# Patient Record
Sex: FEMALE | Race: WHITE
[De-identification: names, ages, dates, MRNs, and addresses within clinical notes are randomized per-mention and may not be internally consistent; named-entity substitution may affect disease eponyms.]

---

## 2017-10-29 NOTE — EDPHY
H & P


Stated Complaint: vaginal spotting x 2 hours --used a quarter pad so far, mild 

cramping





- Personal History


LMP (Females 10-55): Over 28 Days Ago


Current Tetanus/Diphtheria Vaccine: Unsure


Current Tetanus Diphtheria and Acellular Pertussis (TDAP): Unsure


Tetanus Vaccine Date: 10/2015





- Medical/Surgical History


Hx Asthma: No


Hx Chronic Respiratory Disease: No


Hx Diabetes: No


Hx Cardiac Disease: No


Hx Renal Disease: No


Hx Cirrhosis: No


Hx Alcoholism: No


Hx HIV/AIDS: No


Hx Splenectomy or Spleen Trauma: No


Other PMH: HX: OVARIAN CYST, ENDOMETRIOSIS





- Social History


Smoking Status: Never smoked


Time Seen by Provider: 10/29/17 13:08


HPI/ROS: 





Chief Complaint:  Pregnant, vaginal bleeding





HPI:  27-year-old woman who is status post IVF on October 1st.  She had an 

ultrasound 3 days ago which revealed 2 intrauterine pregnancies, 1 with a 

viable heartbeat level was told the pregnancy is still early because he was at 

6 weeks 2 days.  About 3 hours ago she felt a rush of blood vaginally.  Has 

soaked about a quarter size blood on a pad.  Had some very mild cramping.  Has 

not had any further bleeding.  No nausea or vomiting.  No fevers or chills.





ROS:  10 point Review of Systems is negative except as noted in the HPI.





PMH:  Infertility, IVF





Social History: No smoking, no alcohol,  no recreational drug use





Family History: non-contributory





Physical Exam:


Gen: Awake, Alert, No Distress


HEENT:  


     Nose: no rhinorrhea


     Eyes: PERRLA, EOMI


     Mouth: Moist mucosa 


Neck: Supple, no JVD


Chest: nontender, lungs clear to auscultation


Heart: S1, S2 normal, no murmur


Abd: Soft, non-tender, no guarding


Back: no CVA tenderness, no midline tenderness 


Ext: no edema, non-tender


Skin: no rash


Neuro: CN II-XII intact, Sensation grossly intact, Strength 5/5 in bilateral 

upper and lower extremities


 (Jaswinder Lester)


Constitutional: 


 Initial Vital Signs











Temperature (C)  37.0 C   10/29/17 12:25


 


Heart Rate  77   10/29/17 12:25


 


Respiratory Rate  16   10/29/17 12:25


 


Blood Pressure  111/67   10/29/17 12:25


 


O2 Sat (%)  98   10/29/17 12:25








 











O2 Delivery Mode               Room Air














Allergies/Adverse Reactions: 


 





No Known Allergies Allergy (Unverified 10/29/17 12:23)


 








Home Medications: 














 Medication  Instructions  Recorded


 


Estrogens, Conjugated  10/29/17


 


Progesterone  10/29/17














Medical Decision Making


ED Course/Re-evaluation: 





1610: Evaluated patient and discussed ultrasound results, which show a 

subchorionic hemorrhage. I answered all her questions and recommended resting 

over the next few days. She plans to follow up with her OB this week. She's 

been given the full radiologist's report and a disc of her images to bring with 

her. We discussed return precautions. She is comfortable with plan for follow 

up.  (Gely Rangel)





- Data Points


Laboratory Results: 


 











  10/29/17





  13:10


 


Beta HCG, Quant  66134.00 mIU/mL H mIU/mL





   (0.00-4.83) 














Departure





- Departure


Disposition: Home, Routine, Self-Care


Clinical Impression: 


Subchorionic hemorrhage in first trimester


Qualifiers:


 Fetus number: single or unspecified fetus Qualified Code(s): O41.8X10 - Other 

specified disorders of amniotic fluid and membranes, first trimester, not 

applicable or unspecified





Condition: Good


Instructions:  Subchorionic Hemorrhage (ED)


Additional Instructions: 


Follow up with your doctors in [place] tomorrow. Bring the radiologist's report 

and CD images with you to that appointment. Return to the ED for severe pain, 

dramatic increase in bleeding, lightheadedness, or other worsening of condition.


Referrals: 


Adelaida Augustin MD [Primary Care Provider] - As per Instructions


Stand Alone Forms:  Work Excuse

## 2017-10-29 NOTE — EDPHY
H & P


Stated Complaint: vaginal spotting x 2 hours --used a quarter pad so far, mild 

cramping





- Personal History


LMP (Females 10-55): Over 28 Days Ago


Current Tetanus/Diphtheria Vaccine: Unsure


Current Tetanus Diphtheria and Acellular Pertussis (TDAP): Unsure


Tetanus Vaccine Date: 10/2015





- Medical/Surgical History


Hx Asthma: No


Hx Chronic Respiratory Disease: No


Hx Diabetes: No


Hx Cardiac Disease: No


Hx Renal Disease: No


Hx Cirrhosis: No


Hx Alcoholism: No


Hx HIV/AIDS: No


Hx Splenectomy or Spleen Trauma: No


Other PMH: HX: OVARIAN CYST, ENDOMETRIOSIS





- Social History


Smoking Status: Never smoked


Time Seen by Provider: 10/29/17 13:08


HPI/ROS: 





Chief Complaint:  Pregnant, vaginal bleeding





HPI:  27-year-old woman who is status post IVF on October 1st.  She had an 

ultrasound 3 days ago which revealed 2 intrauterine pregnancies, 1 with a 

viable heartbeat level was told the pregnancy is still early because he was at 

6 weeks 2 days.  About 3 hours ago she felt a rush of blood vaginally.  Has 

soaked about a quarter size blood on a pad.  Had some very mild cramping.  Has 

not had any further bleeding.  No nausea or vomiting.  No fevers or chills.





ROS:  10 point Review of Systems is negative except as noted in the HPI.





PMH:  Infertility, IVF





Social History: No smoking, no alcohol,  no recreational drug use





Family History: non-contributory





Physical Exam:


Gen: Awake, Alert, No Distress


HEENT:  


     Nose: no rhinorrhea


     Eyes: PERRLA, EOMI


     Mouth: Moist mucosa 


Neck: Supple, no JVD


Chest: nontender, lungs clear to auscultation


Heart: S1, S2 normal, no murmur


Abd: Soft, non-tender, no guarding


Back: no CVA tenderness, no midline tenderness 


Ext: no edema, non-tender


Skin: no rash


Neuro: CN II-XII intact, Sensation grossly intact, Strength 5/5 in bilateral 

upper and lower extremities


 (Jaswinder Lester)


Constitutional: 


 Initial Vital Signs











Temperature (C)  37.0 C   10/29/17 12:25


 


Heart Rate  77   10/29/17 12:25


 


Respiratory Rate  16   10/29/17 12:25


 


Blood Pressure  111/67   10/29/17 12:25


 


O2 Sat (%)  98   10/29/17 12:25








 











O2 Delivery Mode               Room Air














Allergies/Adverse Reactions: 


 





No Known Allergies Allergy (Unverified 10/29/17 12:23)


 








Home Medications: 














 Medication  Instructions  Recorded


 


Estrogens, Conjugated  10/29/17


 


Progesterone  10/29/17














Medical Decision Making


ED Course/Re-evaluation: 





1610: Evaluated patient and discussed ultrasound results, which show a 

subchorionic hemorrhage. I answered all her questions and recommended resting 

over the next few days. She plans to follow up with her OB this week. She's 

been given the full radiologist's report and a disc of her images to bring with 

her. We discussed return precautions. She is comfortable with plan for follow 

up.  (Gely Rangel)





- Data Points


Laboratory Results: 


 











  10/29/17





  13:10


 


Beta HCG, Quant  88627.00 mIU/mL H mIU/mL





   (0.00-4.83) 














Departure





- Departure


Disposition: Home, Routine, Self-Care


Clinical Impression: 


Subchorionic hemorrhage in first trimester


Qualifiers:


 Fetus number: single or unspecified fetus Qualified Code(s): O41.8X10 - Other 

specified disorders of amniotic fluid and membranes, first trimester, not 

applicable or unspecified





Condition: Good


Instructions:  Subchorionic Hemorrhage (ED)


Additional Instructions: 


Follow up with your doctors in [place] tomorrow. Bring the radiologist's report 

and CD images with you to that appointment. Return to the ED for severe pain, 

dramatic increase in bleeding, lightheadedness, or other worsening of condition.


Referrals: 


Adelaida Augustin MD [Primary Care Provider] - As per Instructions


Stand Alone Forms:  Work Excuse

## 2017-10-29 NOTE — EDPHY
H & P


Stated Complaint: vaginal spotting x 2 hours --used a quarter pad so far, mild 

cramping





- Personal History


LMP (Females 10-55): Over 28 Days Ago


Current Tetanus/Diphtheria Vaccine: Unsure


Current Tetanus Diphtheria and Acellular Pertussis (TDAP): Unsure


Tetanus Vaccine Date: 10/2015





- Medical/Surgical History


Hx Asthma: No


Hx Chronic Respiratory Disease: No


Hx Diabetes: No


Hx Cardiac Disease: No


Hx Renal Disease: No


Hx Cirrhosis: No


Hx Alcoholism: No


Hx HIV/AIDS: No


Hx Splenectomy or Spleen Trauma: No


Other PMH: HX: OVARIAN CYST, ENDOMETRIOSIS





- Social History


Smoking Status: Never smoked


Time Seen by Provider: 10/29/17 13:08


HPI/ROS: 





Chief Complaint:  Pregnant, vaginal bleeding





HPI:  27-year-old woman who is status post IVF on October 1st.  She had an 

ultrasound 3 days ago which revealed 2 intrauterine pregnancies, 1 with a 

viable heartbeat level was told the pregnancy is still early because he was at 

6 weeks 2 days.  About 3 hours ago she felt a rush of blood vaginally.  Has 

soaked about a quarter size blood on a pad.  Had some very mild cramping.  Has 

not had any further bleeding.  No nausea or vomiting.  No fevers or chills.





ROS:  10 point Review of Systems is negative except as noted in the HPI.





PMH:  Infertility, IVF





Social History: No smoking, no alcohol,  no recreational drug use





Family History: non-contributory





Physical Exam:


Gen: Awake, Alert, No Distress


HEENT:  


     Nose: no rhinorrhea


     Eyes: PERRLA, EOMI


     Mouth: Moist mucosa 


Neck: Supple, no JVD


Chest: nontender, lungs clear to auscultation


Heart: S1, S2 normal, no murmur


Abd: Soft, non-tender, no guarding


Back: no CVA tenderness, no midline tenderness 


Ext: no edema, non-tender


Skin: no rash


Neuro: CN II-XII intact, Sensation grossly intact, Strength 5/5 in bilateral 

upper and lower extremities


 (Jaswinder Lester)


Constitutional: 


 Initial Vital Signs











Temperature (C)  37.0 C   10/29/17 12:25


 


Heart Rate  77   10/29/17 12:25


 


Respiratory Rate  16   10/29/17 12:25


 


Blood Pressure  111/67   10/29/17 12:25


 


O2 Sat (%)  98   10/29/17 12:25








 











O2 Delivery Mode               Room Air














Allergies/Adverse Reactions: 


 





No Known Allergies Allergy (Unverified 10/29/17 12:23)


 








Home Medications: 














 Medication  Instructions  Recorded


 


Estrogens, Conjugated  10/29/17


 


Progesterone  10/29/17














Medical Decision Making


ED Course/Re-evaluation: 





1610: Evaluated patient and discussed ultrasound results, which show a 

subchorionic hemorrhage. I answered all her questions and recommended resting 

over the next few days. She plans to follow up with her OB this week. She's 

been given the full radiologist's report and a disc of her images to bring with 

her. We discussed return precautions. She is comfortable with plan for follow 

up.  (Gely Rangel)





- Data Points


Laboratory Results: 


 











  10/29/17





  13:10


 


Beta HCG, Quant  08472.00 mIU/mL H mIU/mL





   (0.00-4.83) 














Departure





- Departure


Disposition: Home, Routine, Self-Care


Clinical Impression: 


Subchorionic hemorrhage in first trimester


Qualifiers:


 Fetus number: single or unspecified fetus Qualified Code(s): O41.8X10 - Other 

specified disorders of amniotic fluid and membranes, first trimester, not 

applicable or unspecified





Condition: Good


Instructions:  Subchorionic Hemorrhage (ED)


Additional Instructions: 


Follow up with your doctors in [place] tomorrow. Bring the radiologist's report 

and CD images with you to that appointment. Return to the ED for severe pain, 

dramatic increase in bleeding, lightheadedness, or other worsening of condition.


Referrals: 


Adelaida Augustin MD [Primary Care Provider] - As per Instructions


Stand Alone Forms:  Work Excuse

## 2018-01-04 ENCOUNTER — HOSPITAL ENCOUNTER (OUTPATIENT)
Dept: HOSPITAL 80 - FIMAGING | Age: 28
End: 2018-01-04
Attending: OBSTETRICS & GYNECOLOGY
Payer: COMMERCIAL

## 2018-01-04 DIAGNOSIS — O09.812: Primary | ICD-10-CM

## 2018-01-04 DIAGNOSIS — Z3A.16: ICD-10-CM

## 2018-01-25 ENCOUNTER — HOSPITAL ENCOUNTER (OUTPATIENT)
Dept: HOSPITAL 80 - FIMAGING | Age: 28
End: 2018-01-25
Attending: OBSTETRICS & GYNECOLOGY
Payer: COMMERCIAL

## 2018-01-25 DIAGNOSIS — O30.042: Primary | ICD-10-CM

## 2018-01-25 DIAGNOSIS — Z3A.19: ICD-10-CM

## 2018-01-25 DIAGNOSIS — O09.812: ICD-10-CM

## 2018-02-15 ENCOUNTER — HOSPITAL ENCOUNTER (OUTPATIENT)
Dept: HOSPITAL 80 - FIMAGING | Age: 28
End: 2018-02-15
Attending: OBSTETRICS & GYNECOLOGY
Payer: COMMERCIAL

## 2018-02-15 DIAGNOSIS — O09.812: Primary | ICD-10-CM

## 2018-02-15 DIAGNOSIS — Z3A.22: ICD-10-CM

## 2018-02-15 DIAGNOSIS — O30.042: ICD-10-CM

## 2018-03-01 ENCOUNTER — HOSPITAL ENCOUNTER (OUTPATIENT)
Dept: HOSPITAL 80 - FIMAGING | Age: 28
End: 2018-03-01
Attending: OBSTETRICS & GYNECOLOGY
Payer: COMMERCIAL

## 2018-03-01 DIAGNOSIS — O09.812: Primary | ICD-10-CM

## 2018-03-01 DIAGNOSIS — O36.5922: ICD-10-CM

## 2018-03-01 DIAGNOSIS — O30.042: ICD-10-CM

## 2018-03-01 DIAGNOSIS — Z3A.24: ICD-10-CM

## 2018-03-08 ENCOUNTER — HOSPITAL ENCOUNTER (OUTPATIENT)
Dept: HOSPITAL 80 - FIMAGING | Age: 28
End: 2018-03-08
Attending: OBSTETRICS & GYNECOLOGY
Payer: COMMERCIAL

## 2018-03-08 DIAGNOSIS — Z3A.25: ICD-10-CM

## 2018-03-08 DIAGNOSIS — O36.5922: ICD-10-CM

## 2018-03-08 DIAGNOSIS — O30.042: Primary | ICD-10-CM

## 2018-03-08 DIAGNOSIS — O09.812: ICD-10-CM

## 2018-03-15 ENCOUNTER — HOSPITAL ENCOUNTER (OUTPATIENT)
Dept: HOSPITAL 80 - FIMAGING | Age: 28
End: 2018-03-15
Attending: OBSTETRICS & GYNECOLOGY
Payer: COMMERCIAL

## 2018-03-15 DIAGNOSIS — O30.043: ICD-10-CM

## 2018-03-15 DIAGNOSIS — O36.5931: ICD-10-CM

## 2018-03-15 DIAGNOSIS — O09.813: Primary | ICD-10-CM

## 2018-03-15 DIAGNOSIS — Z3A.26: ICD-10-CM

## 2018-03-15 DIAGNOSIS — O43.123: ICD-10-CM

## 2018-03-22 ENCOUNTER — HOSPITAL ENCOUNTER (OUTPATIENT)
Dept: HOSPITAL 80 - FIMAGING | Age: 28
End: 2018-03-22
Attending: OBSTETRICS & GYNECOLOGY
Payer: COMMERCIAL

## 2018-03-22 DIAGNOSIS — O30.043: Primary | ICD-10-CM

## 2018-03-22 DIAGNOSIS — O36.5931: ICD-10-CM

## 2018-03-22 DIAGNOSIS — O09.813: ICD-10-CM

## 2018-03-22 DIAGNOSIS — Z3A.27: ICD-10-CM

## 2018-03-29 ENCOUNTER — HOSPITAL ENCOUNTER (OUTPATIENT)
Dept: HOSPITAL 80 - FIMAGING | Age: 28
End: 2018-03-29
Attending: OBSTETRICS & GYNECOLOGY
Payer: COMMERCIAL

## 2018-03-29 DIAGNOSIS — Z3A.28: ICD-10-CM

## 2018-03-29 DIAGNOSIS — O30.043: ICD-10-CM

## 2018-03-29 DIAGNOSIS — O36.5932: Primary | ICD-10-CM

## 2018-03-29 DIAGNOSIS — O09.813: ICD-10-CM

## 2018-04-05 ENCOUNTER — HOSPITAL ENCOUNTER (OUTPATIENT)
Dept: HOSPITAL 80 - FIMAGING | Age: 28
End: 2018-04-05
Attending: OBSTETRICS & GYNECOLOGY
Payer: COMMERCIAL

## 2018-04-05 DIAGNOSIS — O36.5921: ICD-10-CM

## 2018-04-05 DIAGNOSIS — O30.043: Primary | ICD-10-CM

## 2018-04-05 DIAGNOSIS — Z3A.29: ICD-10-CM

## 2018-04-12 ENCOUNTER — HOSPITAL ENCOUNTER (OUTPATIENT)
Dept: HOSPITAL 80 - FIMAGING | Age: 28
End: 2018-04-12
Attending: OBSTETRICS & GYNECOLOGY
Payer: COMMERCIAL

## 2018-04-12 DIAGNOSIS — O09.813: ICD-10-CM

## 2018-04-12 DIAGNOSIS — Z3A.30: ICD-10-CM

## 2018-04-12 DIAGNOSIS — O36.5931: ICD-10-CM

## 2018-04-12 DIAGNOSIS — O30.043: Primary | ICD-10-CM

## 2018-04-19 ENCOUNTER — HOSPITAL ENCOUNTER (OUTPATIENT)
Dept: HOSPITAL 80 - FIMAGING | Age: 28
End: 2018-04-19
Attending: OBSTETRICS & GYNECOLOGY
Payer: COMMERCIAL

## 2018-04-19 DIAGNOSIS — O30.043: ICD-10-CM

## 2018-04-19 DIAGNOSIS — O36.5932: Primary | ICD-10-CM

## 2018-04-19 DIAGNOSIS — Z3A.31: ICD-10-CM

## 2018-04-19 DIAGNOSIS — O09.813: ICD-10-CM

## 2018-04-26 ENCOUNTER — HOSPITAL ENCOUNTER (OUTPATIENT)
Dept: HOSPITAL 80 - FIMAGING | Age: 28
End: 2018-04-26
Attending: OBSTETRICS & GYNECOLOGY
Payer: COMMERCIAL

## 2018-04-26 DIAGNOSIS — O09.813: Primary | ICD-10-CM

## 2018-04-26 DIAGNOSIS — Z3A.32: ICD-10-CM

## 2018-04-26 DIAGNOSIS — O30.043: ICD-10-CM

## 2018-04-26 DIAGNOSIS — O36.5932: ICD-10-CM

## 2018-06-21 ENCOUNTER — HOSPITAL ENCOUNTER (OUTPATIENT)
Dept: HOSPITAL 80 - CIMAGING | Age: 28
End: 2018-06-21
Attending: INTERNAL MEDICINE
Payer: COMMERCIAL

## 2018-06-21 DIAGNOSIS — Z13.89: Primary | ICD-10-CM
